# Patient Record
Sex: FEMALE | Employment: OTHER | URBAN - METROPOLITAN AREA
[De-identification: names, ages, dates, MRNs, and addresses within clinical notes are randomized per-mention and may not be internally consistent; named-entity substitution may affect disease eponyms.]

---

## 2017-08-10 ENCOUNTER — FOLLOW UP (OUTPATIENT)
Dept: URBAN - METROPOLITAN AREA CLINIC 11 | Facility: CLINIC | Age: 82
End: 2017-08-10

## 2017-08-10 ASSESSMENT — TONOMETRY
OD_IOP_MMHG: 10
OS_IOP_MMHG: 12

## 2017-08-10 ASSESSMENT — VISUAL ACUITY
OD_CC: 20/40+1
OS_CC: 20/20
OS_PH: 20/40
OS_SC: 20/200
OD_SC: 20/30-1

## 2017-08-10 NOTE — PATIENT DISCUSSION
Discussed the options of vitrectomy surgery with membrane peeling vs. observation alone. Risks, benefits and alternatives were discussed in detail. All questions were answered.

## 2017-08-10 NOTE — PATIENT DISCUSSION
I have recommended vitrectomy surgery to remove the cloudy vitreous and the diffuse membrane from the right eye. I feel that this will be visually beneficial. All questions and concerns addressed with Mrs. Rob Askew she would like to schedule in the near future.

## 2017-12-19 ENCOUNTER — FOLLOW UP (OUTPATIENT)
Dept: URBAN - METROPOLITAN AREA CLINIC 11 | Facility: CLINIC | Age: 82
End: 2017-12-19

## 2017-12-19 VITALS — DIASTOLIC BLOOD PRESSURE: 66 MMHG | HEIGHT: 55 IN | SYSTOLIC BLOOD PRESSURE: 128 MMHG

## 2017-12-19 ASSESSMENT — TONOMETRY
OD_IOP_MMHG: 19
OS_IOP_MMHG: 13

## 2017-12-19 ASSESSMENT — VISUAL ACUITY
OD_CC: 20/40-1
OS_CC: 20/25

## 2017-12-19 NOTE — PATIENT DISCUSSION
I have recommended vitrectomy surgery to remove the cloudy vitreous and the diffuse membrane from the right eye. I feel that this will be visually beneficial. All questions and concerns addressed with Mrs. Nayloremmy Yemi I have recommeded that we proceed with surgery as scheduled for January.

## 2018-05-10 ENCOUNTER — FOLLOW UP (OUTPATIENT)
Dept: URBAN - METROPOLITAN AREA CLINIC 11 | Facility: CLINIC | Age: 83
End: 2018-05-10

## 2018-05-10 ASSESSMENT — VISUAL ACUITY
OS_CC: 20/30
OD_CC: 20/40+1

## 2018-05-10 ASSESSMENT — TONOMETRY: OD_IOP_MMHG: 11

## 2021-12-02 ENCOUNTER — MOHS SURGERY-ROUTINE (OUTPATIENT)
Dept: URBAN - METROPOLITAN AREA CLINIC 12 | Facility: CLINIC | Age: 86
Setting detail: DERMATOLOGY
End: 2021-12-02

## 2021-12-02 ENCOUNTER — RX ONLY (RX ONLY)
Age: 86
End: 2021-12-02

## 2021-12-02 DIAGNOSIS — L81.4 OTHER MELANIN HYPERPIGMENTATION: ICD-10-CM

## 2021-12-02 DIAGNOSIS — D22.5 MELANOCYTIC NEVI OF TRUNK: ICD-10-CM

## 2021-12-02 DIAGNOSIS — L30.0 NUMMULAR DERMATITIS: ICD-10-CM

## 2021-12-02 DIAGNOSIS — L30.8 OTHER SPECIFIED DERMATITIS: ICD-10-CM

## 2021-12-02 DIAGNOSIS — L82.1 OTHER SEBORRHEIC KERATOSIS: ICD-10-CM

## 2021-12-02 PROCEDURE — 17311 MOHS 1 STAGE H/N/HF/G: CPT

## 2021-12-02 PROCEDURE — 14061 TIS TRNFR E/N/E/L10.1-30SQCM: CPT

## 2021-12-02 RX ORDER — AZITHROMYCIN 250 MG/1
1 TABLET TABLET, FILM COATED ORAL ONCE A DAY
Qty: 6 | Refills: 0
Start: 2021-12-02

## 2021-12-09 ENCOUNTER — SUTURE REMOVAL (OUTPATIENT)
Dept: URBAN - METROPOLITAN AREA CLINIC 12 | Facility: CLINIC | Age: 86
Setting detail: DERMATOLOGY
End: 2021-12-09

## 2021-12-09 DIAGNOSIS — L82.1 OTHER SEBORRHEIC KERATOSIS: ICD-10-CM

## 2021-12-09 DIAGNOSIS — I83.93 ASYMPTOMATIC VARICOSE VEINS OF BILATERAL LOWER EXTREMITIES: ICD-10-CM

## 2021-12-09 PROCEDURE — 99024 POSTOP FOLLOW-UP VISIT: CPT

## 2022-04-05 ENCOUNTER — OTHER- (OUTPATIENT)
Dept: URBAN - METROPOLITAN AREA CLINIC 12 | Facility: CLINIC | Age: 87
Setting detail: DERMATOLOGY
End: 2022-04-05

## 2022-04-05 DIAGNOSIS — D18.01 HEMANGIOMA OF SKIN AND SUBCUTANEOUS TISSUE: ICD-10-CM

## 2022-04-05 DIAGNOSIS — L81.4 OTHER MELANIN HYPERPIGMENTATION: ICD-10-CM

## 2022-04-05 DIAGNOSIS — Z85.820 PERSONAL HISTORY OF MALIGNANT MELANOMA OF SKIN: ICD-10-CM

## 2022-04-05 DIAGNOSIS — B35.3 TINEA PEDIS: ICD-10-CM

## 2022-04-05 DIAGNOSIS — B35.1 TINEA UNGUIUM: ICD-10-CM

## 2022-04-05 DIAGNOSIS — L82.1 OTHER SEBORRHEIC KERATOSIS: ICD-10-CM

## 2022-04-05 DIAGNOSIS — L90.5 SCAR CONDITIONS AND FIBROSIS OF SKIN: ICD-10-CM

## 2022-04-05 PROCEDURE — 99213 OFFICE O/P EST LOW 20 MIN: CPT

## 2022-06-18 RX ORDER — HYDROCHLOROTHIAZIDE 25 MG/1
TABLET ORAL
COMMUNITY
Start: 2013-12-05

## 2022-06-18 RX ORDER — ALBUTEROL SULFATE 90 UG/1
AEROSOL, METERED RESPIRATORY (INHALATION)
COMMUNITY

## 2022-06-18 RX ORDER — FUROSEMIDE 20 MG/1
TABLET ORAL
COMMUNITY

## 2022-06-18 RX ORDER — VALSARTAN 40 MG/1
TABLET ORAL
COMMUNITY

## 2022-06-18 RX ORDER — LEVOTHYROXINE AND LIOTHYRONINE 38; 9 UG/1; UG/1
TABLET ORAL
COMMUNITY
Start: 2014-05-21

## 2022-06-18 RX ORDER — MELOXICAM 15 MG/1
TABLET ORAL
COMMUNITY

## 2022-06-18 RX ORDER — MONTELUKAST SODIUM 10 MG/1
TABLET ORAL
COMMUNITY

## 2022-06-18 RX ORDER — MECLIZINE HYDROCHLORIDE 25 MG/1
TABLET ORAL
COMMUNITY
Start: 2014-01-23

## 2022-06-18 RX ORDER — DENOSUMAB 60 MG/ML
INJECTION SUBCUTANEOUS
COMMUNITY
Start: 2014-03-07